# Patient Record
Sex: FEMALE | Race: WHITE | NOT HISPANIC OR LATINO | Employment: FULL TIME | URBAN - METROPOLITAN AREA
[De-identification: names, ages, dates, MRNs, and addresses within clinical notes are randomized per-mention and may not be internally consistent; named-entity substitution may affect disease eponyms.]

---

## 2017-05-25 ENCOUNTER — CONVERSION ENCOUNTER (OUTPATIENT)
Dept: MAMMOGRAPHY | Facility: HOSPITAL | Age: 57
End: 2017-05-25

## 2018-06-05 ENCOUNTER — CONVERSION ENCOUNTER (OUTPATIENT)
Dept: MAMMOGRAPHY | Facility: HOSPITAL | Age: 58
End: 2018-06-05

## 2019-06-06 ENCOUNTER — HOSPITAL ENCOUNTER (OUTPATIENT)
Dept: MAMMOGRAPHY | Facility: HOSPITAL | Age: 59
Discharge: HOME | End: 2019-06-06
Attending: OBSTETRICS & GYNECOLOGY

## 2020-01-03 ENCOUNTER — OFFICE VISIT CONVERTED (OUTPATIENT)
Dept: SURGERY | Facility: CLINIC | Age: 60
End: 2020-01-03
Attending: SURGERY

## 2020-02-17 ENCOUNTER — HOSPITAL ENCOUNTER (OUTPATIENT)
Dept: GASTROENTEROLOGY | Facility: HOSPITAL | Age: 60
Setting detail: HOSPITAL OUTPATIENT SURGERY
Discharge: HOME | End: 2020-02-17
Attending: SURGERY

## 2020-03-17 ENCOUNTER — OFFICE VISIT CONVERTED (OUTPATIENT)
Dept: SURGERY | Facility: CLINIC | Age: 60
End: 2020-03-17
Attending: SURGERY

## 2020-06-09 ENCOUNTER — HOSPITAL ENCOUNTER (OUTPATIENT)
Dept: MAMMOGRAPHY | Facility: HOSPITAL | Age: 60
Discharge: HOME | End: 2020-06-09
Attending: OBSTETRICS & GYNECOLOGY

## 2021-05-15 VITALS — HEIGHT: 62 IN | BODY MASS INDEX: 25.58 KG/M2 | RESPIRATION RATE: 12 BRPM | WEIGHT: 139 LBS

## 2021-05-15 VITALS — HEIGHT: 62 IN | RESPIRATION RATE: 12 BRPM | WEIGHT: 139.12 LBS | BODY MASS INDEX: 25.6 KG/M2

## 2021-05-22 ENCOUNTER — TRANSCRIBE ORDERS (OUTPATIENT)
Dept: ADMINISTRATIVE | Facility: HOSPITAL | Age: 61
End: 2021-05-22

## 2021-05-22 DIAGNOSIS — Z12.39 SCREENING BREAST EXAMINATION: Primary | ICD-10-CM

## 2021-06-11 ENCOUNTER — HOSPITAL ENCOUNTER (OUTPATIENT)
Dept: MAMMOGRAPHY | Facility: HOSPITAL | Age: 61
Discharge: HOME | End: 2021-06-11
Admitting: OBSTETRICS & GYNECOLOGY

## 2021-06-11 DIAGNOSIS — Z12.39 SCREENING BREAST EXAMINATION: ICD-10-CM

## 2021-06-11 PROCEDURE — 77063 BREAST TOMOSYNTHESIS BI: CPT

## 2021-06-11 PROCEDURE — 77067 SCR MAMMO BI INCL CAD: CPT

## 2021-06-11 PROCEDURE — 77067 SCR MAMMO BI INCL CAD: CPT | Performed by: RADIOLOGY

## 2021-06-11 PROCEDURE — 77063 BREAST TOMOSYNTHESIS BI: CPT | Performed by: RADIOLOGY

## 2021-06-14 ENCOUNTER — TRANSCRIBE ORDERS (OUTPATIENT)
Dept: ADMINISTRATIVE | Facility: HOSPITAL | Age: 61
End: 2021-06-14

## 2021-06-14 DIAGNOSIS — Z78.0 POST-MENOPAUSAL: Primary | ICD-10-CM

## 2021-06-29 ENCOUNTER — HOSPITAL ENCOUNTER (OUTPATIENT)
Dept: BONE DENSITY | Facility: HOSPITAL | Age: 61
Discharge: HOME | End: 2021-06-29
Admitting: OBSTETRICS & GYNECOLOGY

## 2021-06-29 DIAGNOSIS — Z78.0 POST-MENOPAUSAL: ICD-10-CM

## 2021-06-29 PROCEDURE — 77080 DXA BONE DENSITY AXIAL: CPT

## 2021-06-29 PROCEDURE — 77080 DXA BONE DENSITY AXIAL: CPT | Performed by: RADIOLOGY

## 2022-04-15 ENCOUNTER — TRANSCRIBE ORDERS (OUTPATIENT)
Dept: ADMINISTRATIVE | Facility: HOSPITAL | Age: 62
End: 2022-04-15

## 2022-04-15 DIAGNOSIS — Z12.31 SCREENING MAMMOGRAM FOR BREAST CANCER: Primary | ICD-10-CM

## 2022-06-20 NOTE — PROGRESS NOTES
"Well Woman Visit    CC: Scheduled annual well gyn visit  Chief Complaint   Patient presents with   • Annual Exam       [facility] intake in the past?: No    Previously Qualified? NO    Post menopausal, using no HRT    HPI:   62 y.o.       PCP: does manage PMHx and preventative labs  History: PMHx, Meds, Allergies, PSHx, Social Hx, and POBHx all reviewed and updated.    Pt has no complaints today.    PHYSICAL EXAM:  /81   Pulse 114   Ht 157.5 cm (62\")   Wt 65.3 kg (144 lb)   BMI 26.34 kg/m²  Not found.  General- NAD, alert and oriented, appropriate  Psych- Normal mood, good memory  Neck- No masses, no thyroid enlargement  CV- Regular rhythm, no murnurs  Resp- CTA to bases, no wheezes  Abdomen- Soft, non distended, non tender, no masses    Breast left-  Bilaterally symmetrical, no masses, non tender, no nipple discharge  Breast right- Bilaterally symmetrical, no masses, non tender, no nipple discharge    External genitalia- Normal female, no lesions  Urethra/meatus- Normal, no masses, non tender  Bladder- Normal, no masses, non tender  Vagina- Normal, no atrophy, no lesions, no discharge.  Prolapse: Cystocele  Cvx- Normal, no lesions, no discharge, No cervical motion tenderness  Uterus- Normal size, shape & consistency.  Non tender, mobile, & no prolapse, PROLAPSE  Adnexa- No mass, non tender  Anus/Rectum/Perineum- Not performed    Lymphatic- No palpable neck, axillary, or groin nodes  Ext- No edema, no cyanosis    Skin- No lesions, no rashes, no acanthosis nigricans      ASSESSMENT and PLAN:    Diagnoses and all orders for this visit:    1. Well woman exam with routine gynecological exam (Primary)  -     Mammo Screening Digital Tomosynthesis Bilateral With CAD; Future  -     IgP, Aptima HPV    2. Cystocele with uterine prolapse  Assessment & Plan:  Has been present for years  Currently asymptomatic        Preventative:  • BREAST HEALTH- Monthly self breast exam importance and how to reviewed. MMG and/or " MRI (prn) reviewed per society guidelines and her individual history. Screen: Pt will call to schedule  • CERVICAL CANCER Screening- Reviewed current ASCCP guidelines for screening w and wo cotest HPV, age specific.  Screen: Updated today  • COLON CANCER Screening- Reviewed current medical society guidelines and options.  Screen:  Already up to date  • Follow up PCP/Specialist PMHx and Labs      She understands the importance of having any ordered tests to be performed in a timely fashion.  The risks of not performing them include, but are not limited to, advanced cancer stages, bone loss from osteoporosis and/or subsequent increase in morbidity and/or mortality.  She is encouraged to review her results online and/or contact or office if she has questions.     Follow Up:  Return in about 1 year (around 6/22/2023) for Annual physical.            Cami Mejia MD  06/22/2022    [facility] [place] [facility]  [address] [place] [address]  Dept: [phone]  Dept Fax: [phone]  Loc: [phone]  Loc Fax: [phone]

## 2022-06-22 ENCOUNTER — HOSPITAL ENCOUNTER (OUTPATIENT)
Dept: MAMMOGRAPHY | Facility: HOSPITAL | Age: 62
Discharge: HOME | End: 2022-06-22
Admitting: OBSTETRICS & GYNECOLOGY

## 2022-06-22 ENCOUNTER — OFFICE VISIT (OUTPATIENT)
Dept: OBSTETRICS AND GYNECOLOGY | Facility: CLINIC | Age: 62
End: 2022-06-22

## 2022-06-22 VITALS
DIASTOLIC BLOOD PRESSURE: 81 MMHG | WEIGHT: 144 LBS | HEART RATE: 114 BPM | SYSTOLIC BLOOD PRESSURE: 168 MMHG | BODY MASS INDEX: 26.5 KG/M2 | HEIGHT: 62 IN

## 2022-06-22 DIAGNOSIS — Z01.419 WELL WOMAN EXAM WITH ROUTINE GYNECOLOGICAL EXAM: Primary | ICD-10-CM

## 2022-06-22 DIAGNOSIS — Z12.31 SCREENING MAMMOGRAM FOR BREAST CANCER: ICD-10-CM

## 2022-06-22 DIAGNOSIS — N81.4 CYSTOCELE WITH UTERINE PROLAPSE: ICD-10-CM

## 2022-06-22 PROCEDURE — 77063 BREAST TOMOSYNTHESIS BI: CPT

## 2022-06-22 PROCEDURE — 99396 PREV VISIT EST AGE 40-64: CPT | Performed by: OBSTETRICS & GYNECOLOGY

## 2022-06-22 PROCEDURE — G0123 SCREEN CERV/VAG THIN LAYER: HCPCS | Performed by: OBSTETRICS & GYNECOLOGY

## 2022-06-22 PROCEDURE — 77067 SCR MAMMO BI INCL CAD: CPT

## 2022-06-22 PROCEDURE — 87624 HPV HI-RISK TYP POOLED RSLT: CPT | Performed by: OBSTETRICS & GYNECOLOGY

## 2022-06-22 RX ORDER — HYDROCHLOROTHIAZIDE 12.5 MG/1
TABLET ORAL
COMMUNITY
Start: 2022-03-26

## 2022-06-22 RX ORDER — ATORVASTATIN CALCIUM 40 MG/1
TABLET, FILM COATED ORAL
COMMUNITY
Start: 2022-06-03

## 2022-06-22 RX ORDER — GABAPENTIN 300 MG/1
CAPSULE ORAL
COMMUNITY
Start: 2022-04-10

## 2022-06-22 RX ORDER — IBUPROFEN 200 MG
CAPSULE ORAL
COMMUNITY

## 2022-06-22 RX ORDER — SIMVASTATIN 40 MG
TABLET ORAL
COMMUNITY
End: 2022-06-22

## 2022-06-22 RX ORDER — ENALAPRIL MALEATE 20 MG/1
TABLET ORAL
COMMUNITY
Start: 2022-06-03

## 2022-06-22 RX ORDER — OMEPRAZOLE 20 MG/1
TABLET, DELAYED RELEASE ORAL
COMMUNITY

## 2022-06-22 RX ORDER — ASPIRIN 81 MG/1
TABLET ORAL
COMMUNITY

## 2022-06-28 LAB
CYTOLOGIST CVX/VAG CYTO: NORMAL
CYTOLOGY CVX/VAG DOC CYTO: NORMAL
CYTOLOGY CVX/VAG DOC THIN PREP: NORMAL
DX ICD CODE: NORMAL
HIV 1 & 2 AB SER-IMP: NORMAL
HPV I/H RISK 4 DNA CVX QL PROBE+SIG AMP: NEGATIVE
Lab: NORMAL
OTHER STN SPEC: NORMAL
STAT OF ADQ CVX/VAG CYTO-IMP: NORMAL

## 2023-04-05 ENCOUNTER — TRANSCRIBE ORDERS (OUTPATIENT)
Dept: ADMINISTRATIVE | Facility: HOSPITAL | Age: 63
End: 2023-04-05
Payer: COMMERCIAL

## 2023-04-05 ENCOUNTER — TELEPHONE (OUTPATIENT)
Dept: OBSTETRICS AND GYNECOLOGY | Facility: CLINIC | Age: 63
End: 2023-04-05
Payer: COMMERCIAL

## 2023-04-05 DIAGNOSIS — Z12.31 ENCOUNTER FOR SCREENING MAMMOGRAM FOR MALIGNANT NEOPLASM OF BREAST: Primary | ICD-10-CM

## 2023-04-05 NOTE — TELEPHONE ENCOUNTER
Patient had contacted scheduling after calling our office. They have placed the order for her screening mammogram.

## 2023-04-05 NOTE — TELEPHONE ENCOUNTER
"Caller: Soren Harris \"Sera\"    Relationship: Self    Best call back number: [phone]    What orders are you requesting (i.e. lab or imaging): MAMMOGRAM    In what timeframe would the patient need to come in: 6/23/23    Where will you receive your lab/imaging services: [facility]    Additional notes: PLEASE ADVISE PT WHEN ORDER IS PLACED.      "

## 2024-03-05 ENCOUNTER — TRANSCRIBE ORDERS (OUTPATIENT)
Dept: ADMINISTRATIVE | Facility: HOSPITAL | Age: 64
End: 2024-03-05
Payer: COMMERCIAL

## 2024-03-05 DIAGNOSIS — Z12.31 ENCOUNTER FOR SCREENING MAMMOGRAM FOR BREAST CANCER: Primary | ICD-10-CM

## 2024-05-21 NOTE — PROGRESS NOTES
"Well Woman Visit    CC: Scheduled annual well gyn visit  Chief Complaint   Patient presents with    Gynecologic Exam         HPI:   64 y.o. who presents today for annual exam. Patient does not have cycles. She denies any family H/O breast, uterine or ovarian cancer. She denies any vaginal odor, vaginal discharge, dysuria/hematuria, F/C, D/C, N/V, CP or SOB. Admits to prolapse but it does not bother her. Mammo scheduled for today.    History: PMHx, Meds, Allergies, PSHx, Social Hx, and POBHx all reviewed and updated.  Last Pap :   Last Completed Pap Smear            PAP SMEAR (Every 3 Years) Next due on 2022  IgP, Aptima HPV    2021  SCANNED - PAP SMEAR                  Last MMG :   Last Completed Mammogram       This patient has no relevant Health Maintenance data.           Last Colonoscopy :   Last Completed Colonoscopy            COLORECTAL CANCER SCREENING (COLONOSCOPY - Every 5 Years) Next due on 2/10/2025      02/10/2020  Outside Procedure: [place] COLONOSCOPY FLX DX W/COLLJ SPEC WHEN PFRMD                    ROS:  General ROS: negative for chills or fatigue  Breast ROS: negative new or changing breast lumps  Gastrointestinal ROS: negative for abdominal pain or appetite loss  Genito-Urinary ROS: negative for difficulty in urination or vaginal irritation   Psych ROS: negative for depression      PHYSICAL EXAM:      /79   Pulse (!) 121   Ht 157.5 cm (62\")   Wt 58.1 kg (128 lb)   BMI 23.41 kg/m²  Not found.    General- NAD, alert and oriented, appropriate  Psych- Normal mood, good memory  Resp- No labored breathing  Abdomen- Soft, non distended, non tender    Breast Exam: Breast self awareness counseled  Breast left-  Bilaterally symmetrical, no masses, non tender, no nipple discharge  Breast right- Bilaterally symmetrical, no masses, non tender, no nipple discharge    Pelvic Exam with chaperone present:   External genitalia- Normal female, no lesions  Urethra/meatus- " Normal, no masses, non tender  Bladder- Normal, no masses, non tender  Vagina- Normal, no atrophy, no lesions, no discharge.  Prolapse : Cystocele  Cvx- Normal, no lesions, no discharge, No cervical motion tenderness,    Uterus- Normal size, shape & consistency.  Non tender, mobile., Prolapse    Adnexa- No mass, non tender  Anus/Rectum/Perineum- Not performed    Ext- No edema  Skin- No lesions, no rashes, no acanthosis nigricans      ASSESSMENT and PLAN:    Diagnoses and all orders for this visit:    1. Well woman exam with routine gynecological exam (Primary)    2. Cystocele with uterine prolapse        Preventative:  1. Annuals every year; Cytology collections per prevailing guidelines.   2. Mammograms begin every year at 41 yo if no abnormalities are found and no family history.  3. Bone density studies begin at 66 yo. If no fracture history or osteoporosis family history.  4. Colonoscopy begins at 44 yo. Repeat every ten years if negative and no family history.  5. Calcium of 4713-1492 mg/day in split dosing  6. Vitamin D 400-800 IU/day  7. All other preventative health recommendations will be managed by the patients Primary care physician.    She understands the importance of having any ordered tests to be performed in a timely fashion.  The risks of not performing them include, but are not limited to, advanced cancer stages, bone loss from osteoporosis and/or subsequent increase in morbidity and/or mortality.  She is encouraged to review her results online and/or contact or office if she has questions.     Follow Up:  Return in about 1 year (around 5/28/2025) for Annual physical.            Monalisa Stroud DO  05/28/2024    [facility] [place] [facility]  [address] [place] [address]  Dept: [phone]  Dept Fax: [phone]  Loc: [phone]  Loc Fax: [phone]

## 2024-05-28 ENCOUNTER — OFFICE VISIT (OUTPATIENT)
Dept: OBSTETRICS AND GYNECOLOGY | Facility: CLINIC | Age: 64
End: 2024-05-28
Payer: COMMERCIAL

## 2024-05-28 ENCOUNTER — HOSPITAL ENCOUNTER (OUTPATIENT)
Dept: MAMMOGRAPHY | Facility: HOSPITAL | Age: 64
Discharge: HOME | End: 2024-05-28
Admitting: OBSTETRICS & GYNECOLOGY
Payer: COMMERCIAL

## 2024-05-28 VITALS
SYSTOLIC BLOOD PRESSURE: 171 MMHG | HEART RATE: 121 BPM | WEIGHT: 128 LBS | BODY MASS INDEX: 23.55 KG/M2 | DIASTOLIC BLOOD PRESSURE: 79 MMHG | HEIGHT: 62 IN

## 2024-05-28 DIAGNOSIS — Z01.419 WELL WOMAN EXAM WITH ROUTINE GYNECOLOGICAL EXAM: Primary | ICD-10-CM

## 2024-05-28 DIAGNOSIS — Z12.31 ENCOUNTER FOR SCREENING MAMMOGRAM FOR BREAST CANCER: ICD-10-CM

## 2024-05-28 DIAGNOSIS — N81.4 CYSTOCELE WITH UTERINE PROLAPSE: ICD-10-CM

## 2024-05-28 PROCEDURE — 77063 BREAST TOMOSYNTHESIS BI: CPT

## 2024-05-28 PROCEDURE — 77067 SCR MAMMO BI INCL CAD: CPT

## 2024-05-28 PROCEDURE — 99396 PREV VISIT EST AGE 40-64: CPT | Performed by: STUDENT IN AN ORGANIZED HEALTH CARE EDUCATION/TRAINING PROGRAM
